# Patient Record
Sex: MALE | Race: WHITE
[De-identification: names, ages, dates, MRNs, and addresses within clinical notes are randomized per-mention and may not be internally consistent; named-entity substitution may affect disease eponyms.]

---

## 2020-06-29 ENCOUNTER — HOSPITAL ENCOUNTER (EMERGENCY)
Dept: HOSPITAL 63 - ER | Age: 55
Discharge: HOME | End: 2020-06-29
Payer: OTHER GOVERNMENT

## 2020-06-29 VITALS
SYSTOLIC BLOOD PRESSURE: 123 MMHG | SYSTOLIC BLOOD PRESSURE: 123 MMHG | DIASTOLIC BLOOD PRESSURE: 76 MMHG | DIASTOLIC BLOOD PRESSURE: 76 MMHG

## 2020-06-29 VITALS — BODY MASS INDEX: 29.1 KG/M2 | WEIGHT: 219.58 LBS | HEIGHT: 73 IN

## 2020-06-29 DIAGNOSIS — F17.210: ICD-10-CM

## 2020-06-29 DIAGNOSIS — R07.2: Primary | ICD-10-CM

## 2020-06-29 LAB
ALBUMIN SERPL-MCNC: 3.7 G/DL (ref 3.4–5)
ALBUMIN/GLOB SERPL: 1.2 {RATIO} (ref 1–1.7)
ALP SERPL-CCNC: 46 U/L (ref 46–116)
ALT SERPL-CCNC: 25 U/L (ref 16–63)
ANION GAP SERPL CALC-SCNC: 9 MMOL/L (ref 6–14)
AST SERPL-CCNC: 16 U/L (ref 15–37)
BASOPHILS # BLD AUTO: 0 X10^3/UL (ref 0–0.2)
BASOPHILS NFR BLD: 1 % (ref 0–3)
BILIRUB SERPL-MCNC: 0.3 MG/DL (ref 0.2–1)
BUN/CREAT SERPL: 14 (ref 6–20)
CA-I SERPL ISE-MCNC: 17 MG/DL (ref 8–26)
CALCIUM SERPL-MCNC: 8.7 MG/DL (ref 8.5–10.1)
CHLORIDE SERPL-SCNC: 106 MMOL/L (ref 98–107)
CO2 SERPL-SCNC: 25 MMOL/L (ref 21–32)
CREAT SERPL-MCNC: 1.2 MG/DL (ref 0.7–1.3)
EOSINOPHIL NFR BLD: 0.2 X10^3/UL (ref 0–0.7)
EOSINOPHIL NFR BLD: 3 % (ref 0–3)
ERYTHROCYTE [DISTWIDTH] IN BLOOD BY AUTOMATED COUNT: 13.6 % (ref 11.5–14.5)
GFR SERPLBLD BASED ON 1.73 SQ M-ARVRAT: 62.6 ML/MIN
GLOBULIN SER-MCNC: 3.1 G/DL (ref 2.2–3.8)
GLUCOSE SERPL-MCNC: 114 MG/DL (ref 70–99)
HCT VFR BLD CALC: 41.8 % (ref 39–53)
HGB BLD-MCNC: 14.4 G/DL (ref 13–17.5)
LIPASE: 123 U/L (ref 73–393)
LYMPHOCYTES # BLD: 1.5 X10^3/UL (ref 1–4.8)
LYMPHOCYTES NFR BLD AUTO: 32 % (ref 24–48)
MAGNESIUM SERPL-MCNC: 1.9 MG/DL (ref 1.8–2.4)
MCH RBC QN AUTO: 31 PG (ref 25–35)
MCHC RBC AUTO-ENTMCNC: 34 G/DL (ref 31–37)
MCV RBC AUTO: 90 FL (ref 79–100)
MONO #: 0.4 X10^3/UL (ref 0–1.1)
MONOCYTES NFR BLD: 9 % (ref 0–9)
NEUT #: 2.7 X10^3UL (ref 1.8–7.7)
NEUTROPHILS NFR BLD AUTO: 56 % (ref 31–73)
PLATELET # BLD AUTO: 221 X10^3/UL (ref 140–400)
POTASSIUM SERPL-SCNC: 3.9 MMOL/L (ref 3.5–5.1)
PROT SERPL-MCNC: 6.8 G/DL (ref 6.4–8.2)
RBC # BLD AUTO: 4.63 X10^6/UL (ref 4.3–5.7)
SODIUM SERPL-SCNC: 140 MMOL/L (ref 136–145)
WBC # BLD AUTO: 4.8 X10^3/UL (ref 4–11)

## 2020-06-29 PROCEDURE — 83880 ASSAY OF NATRIURETIC PEPTIDE: CPT

## 2020-06-29 PROCEDURE — 84484 ASSAY OF TROPONIN QUANT: CPT

## 2020-06-29 PROCEDURE — 93005 ELECTROCARDIOGRAM TRACING: CPT

## 2020-06-29 PROCEDURE — 99285 EMERGENCY DEPT VISIT HI MDM: CPT

## 2020-06-29 PROCEDURE — 83735 ASSAY OF MAGNESIUM: CPT

## 2020-06-29 PROCEDURE — 82553 CREATINE MB FRACTION: CPT

## 2020-06-29 PROCEDURE — 85025 COMPLETE CBC W/AUTO DIFF WBC: CPT

## 2020-06-29 PROCEDURE — 80053 COMPREHEN METABOLIC PANEL: CPT

## 2020-06-29 PROCEDURE — 36415 COLL VENOUS BLD VENIPUNCTURE: CPT

## 2020-06-29 PROCEDURE — 96374 THER/PROPH/DIAG INJ IV PUSH: CPT

## 2020-06-29 PROCEDURE — 83690 ASSAY OF LIPASE: CPT

## 2020-06-29 PROCEDURE — 85730 THROMBOPLASTIN TIME PARTIAL: CPT

## 2020-06-29 PROCEDURE — 71046 X-RAY EXAM CHEST 2 VIEWS: CPT

## 2020-06-29 PROCEDURE — 85610 PROTHROMBIN TIME: CPT

## 2020-06-29 PROCEDURE — 85379 FIBRIN DEGRADATION QUANT: CPT

## 2020-06-29 NOTE — PHYS DOC
Past History


Past Medical History:  No Pertinent History


Past Surgical History:  No Surgical History


Smoking:  Cigar


Alcohol Use:  Occasionally


Drug Use:  None





General Adult


EDM:


Chief Complaint:  CHEST PAIN





HPI:


HPI:





55-year-old male presents with report of midsternal chest discomfort that 

started last Thursday.  Patient reports it was initially worse but has since 

improved but not gone away.  Patient denies any radiation.  Denies fever or 

chills.  Denies trauma.  Denies nausea or vomiting.  Denies leg swelling or calf

tenderness.  





Patient reports initially had not been feeling well with low energy.  Reports 

has since improved.  Patient denies known exposure to COVID-19.  Patient reports

he recently traveled to [place] and [place].





Patient with minimal cardiac risk factors.  Denies smoking cigarettes but does 

report smoking cigars.  Denies history or family history of PE/DVT.





Review of Systems:


Review of Systems:





Constitutional: Denies fever or chills 


Eyes: Denies redness or eye pain 


HENT: Denies nasal congestion or sore throat


Respiratory: Denies cough or shortness of breath 


Cardiovascular: Reports chest pain; denies palpitations


GI: Denies abdominal pain, nausea, or vomiting


: Denies dysuria or hematuria


Musculoskeletal: Denies back pain or joint pain


Integument: Denies rash or skin lesions 


Neurologic: Denies headache, focal weakness or sensory changes





Complete systems were reviewed and found to be within normal limits, except as 

documented in this note.





Heart Score:


HEART Score for Chest Pain:  








HEART Score for Chest Pain Response (Comments) Value


 


History Slighlty/Non-Suspicious 0


 


ECG Normal 0


 


Age >45 - < 65 1


 


Risk Factors                            1 or 2 Risk Factors 1


 


Troponin < Normal Limit 0


 


Total  2








Risk Factors:


Risk Factors:  DM, Current or recent (<one month) smoker, HTN, HLP, family 

history of CAD, obesity.


Risk Scores:


Score 0 - 3:  2.5% MACE over next 6 weeks - Discharge Home


Score 4 - 6:  20.3% MACE over next 6 weeks - Admit for Clinical Observation


Score 7 - 10:  72.7% MACE over next 6 weeks - Early Invasive Strategies





Physical Exam:


PE:





Constitutional: Well developed, well nourished, no acute distress, non-toxic 

appearance


HENT: Normocephalic, atraumatic


Eyes: Conjunctiva normal, no discharge


Neck: Normal range of motion, no tenderness, supple


Lungs & Thorax: No respiratory distress, equal chest rise and fall


Abdomen: Soft, no tenderness


Skin: Warm, dry, no erythema, no rash


Extremities: No tenderness, ROM intact, no edema


Neurologic: Alert and oriented X 3, no focal deficits noted


Psychologic: Affect normal, judgment normal





EKG:


EKG:


@1011 NSR at 69bpm, NO ST elevation, QRS 78ms, QT/QTc 396/426ms





Radiology/Procedures:


Radiology/Procedures:


PROCEDURE: CHEST PA & LATERAL





PA and lateral views of the chest. 


 


Comparison: None.


 


Indication:  Chest pain


 


Findings:


 


Normal lung volume. Bulging contour of the bilateral lower mediastinal 


borders on the frontal view. No focal airspace disease. Normal pulmonary 


vasculature. No pleural effusion. No pneumothorax. The cardiomediastinal 


silhouette is normal in appearance. The great vessels are normal. No acute


osseous abnormality. Mild multilevel degenerative changes of the 


visualized spine.


 


Impression: 


1. No focal consolidation.


2. Bulging contour of the bilateral lower mediastinal borders on the 


frontal view. Findings may relate to pericardial/mediastinal fat, but no 


prior is available for comparison. Oval contour seen in the anterior 


mediastinum on the lateral view likely corresponds to the frontal view 


findings. Recommend CT chest with contrast for full evaluation.


 


Electronically signed by: Thompson Valdez MD (6/29/2020 11:56 AM) 


[id number]





Course & Med Decision Making:


Course & Med Decision Making


Pertinent Labs and Imaging studies reviewed. (See chart for details)





Patient with low cardiac risk factors presents with report of midsternal chest 

discomfort.  Pain is nonexertional.  Denies pleuritic pain.  No clinical signs 

of DVT.  EKG stable.  Labs obtained and posted to chart.  Troponin within normal

limits.  D-dimer within normal limits.  Chest x-ray without acute process.





HEART score 2.





Patient stable for discharge with outpatient follow-up with PCP/GI/Cardiology.  

GI and cardiology referrals provided. Discussed findings and plan with patient, 

who acknowledges understanding and agreement.





Dragon Disclaimer:


Dragon Disclaimer:


This electronic medical record was generated, in whole or in part, using a voice

recognition dictation system.





Departure


Departure:


Impression:  


   Primary Impression:  


   Chest pain


   Qualified Codes:  R07.9 - Chest pain, unspecified


Disposition:  01 HOME/RESIDENCE PRIOR TO ADM


Condition:  STABLE


Referrals:  


PCP,QUAN (PCP)








TESSA PALENCIA MD, SCOTT S MD


Patient Instructions:  Chest Pain (Nonspecific), Easy-to-Read


Scripts


Famotidine (PEPCID) 20 Mg Tablet


1 TAB PO BID for Gastritis, #20 TAB


   Prov: DANNY WALTER DO         6/29/20





Justification of Admission:


Justification of Admission:


Justification of Admission Dx:  N/A











DANNY WALTER DO             Jun 29, 2020 10:04

## 2020-06-29 NOTE — RAD
PA and lateral views of the chest. 

 

Comparison: None.

 

Indication:  Chest pain

 

Findings:

 

Normal lung volume. Bulging contour of the bilateral lower mediastinal 

borders on the frontal view. No focal airspace disease. Normal pulmonary 

vasculature. No pleural effusion. No pneumothorax. The cardiomediastinal 

silhouette is normal in appearance. The great vessels are normal. No acute

osseous abnormality. Mild multilevel degenerative changes of the 

visualized spine.

 

Impression: 

1. No focal consolidation.

2. Bulging contour of the bilateral lower mediastinal borders on the 

frontal view. Findings may relate to pericardial/mediastinal fat, but no 

prior is available for comparison. Oval contour seen in the anterior 

mediastinum on the lateral view likely corresponds to the frontal view 

findings. Recommend CT chest with contrast for full evaluation.

 

Electronically signed by: Thompson Valdez MD (6/29/2020 11:56 AM) 

[id number]

## 2020-06-29 NOTE — EKG
[facility] [address], [place] [address]

Test Date:    2020               Test Time:    10:11:30

Pat Name:     WILMA SPAIN       Department:   

Patient ID:   [id number]           Room:          

Gender:       M                        Technician:   

:          [id number]               Requested By: DANNY WALTER

Order Number: [id number]            Reading MD:   Devang Gutierrez

                                 Measurements

Intervals                              Axis          

Rate:         69                       P:            52

ND:           156                      QRS:          36

QRSD:         78                       T:            34

QT:           396                                    

QTc:          426                                    

                           Interpretive Statements

SINUS RHYTHM

NORMAL ECG

RI6.02

No previous ECG available for comparison

Electronically Signed On 2020 12:33:34 CDT by Devang Gutierrez

## 2020-08-21 ENCOUNTER — HOSPITAL ENCOUNTER (OUTPATIENT)
Dept: HOSPITAL 63 - CT | Age: 55
Discharge: HOME | End: 2020-08-21
Payer: OTHER GOVERNMENT

## 2020-08-21 DIAGNOSIS — I25.10: ICD-10-CM

## 2020-08-21 DIAGNOSIS — J98.59: ICD-10-CM

## 2020-08-21 DIAGNOSIS — R91.1: Primary | ICD-10-CM

## 2020-08-21 PROCEDURE — 71260 CT THORAX DX C+: CPT

## 2020-08-21 RX ADMIN — IOHEXOL ONE ML: 300 INJECTION, SOLUTION INTRAVENOUS at 13:10

## 2020-08-21 NOTE — RAD
EXAM: CT OF THE CHEST WITH CONTRAST.

 

HISTORY: Palpable mass along the sternum.

 

TECHNIQUE: Computed tomography of the chest was performed after the 

intravenous administration of iodinated contrast. One or more of the 

following individualized dose reduction techniques were utilized for this 

examination:  

1. Automated exposure control.  

2. Adjustment of the mA and/or kV according to patient size.  

3. Use of iterative reconstruction technique.

 

COMPARISON: None.

 

FINDINGS: A skin marker is placed at the site of concern along the 

sternoxiphoid junction. The underlying bones are unremarkable. There is 

suggestion of a thin capsule about a fat density mass underlying the skin 

marker suggesting a small subcutaneous lipoma. There is no suspicious soft

tissue mass.

 

Images of the upper abdomen reveal no acute abnormality. Bone windows 

reveal no suspicious lesions.

 

There are no pathologically enlarged mediastinal or axillary lymph nodes. 

There is no pleural or pericardial effusion. The heart is not enlarged. 

There are atherosclerotic calcifications of the coronary arteries.

 

A 6 x 3 mm (average 4.5 mm) uncalcified nodule is noted in the left lower 

lobe on image 89.

 

IMPRESSION:

1. No suspicious mass is identified at the site of concern. A small 

subcutaneous lipoma cannot be excluded at the site of concern. Recommend 

ongoing clinical follow-up of palpable foci to ensure stability.

2. A 4.5 mm nodule in the left lower lobe requires no further follow-up at

this small size in the absence of strong risk factors. If there are strong

risk factors and no prior examinations are available for comparison, 

follow-up could be considered in one year.

 

Electronically signed by: JUAN LUIS Rojas MD (8/21/2020 4:48 PM) [id number]